# Patient Record
Sex: MALE | NOT HISPANIC OR LATINO | Employment: OTHER | URBAN - METROPOLITAN AREA
[De-identification: names, ages, dates, MRNs, and addresses within clinical notes are randomized per-mention and may not be internally consistent; named-entity substitution may affect disease eponyms.]

---

## 2021-03-24 DIAGNOSIS — N50.819 TESTICULAR PAIN: Primary | ICD-10-CM

## 2021-03-25 ENCOUNTER — OFFICE VISIT (OUTPATIENT)
Dept: UROLOGY | Facility: CLINIC | Age: 76
End: 2021-03-25
Payer: MEDICARE

## 2021-03-25 VITALS
SYSTOLIC BLOOD PRESSURE: 135 MMHG | BODY MASS INDEX: 47.74 KG/M2 | HEIGHT: 68 IN | WEIGHT: 315 LBS | HEART RATE: 79 BPM | RESPIRATION RATE: 18 BRPM | DIASTOLIC BLOOD PRESSURE: 73 MMHG

## 2021-03-25 DIAGNOSIS — N50.812 TESTICULAR PAIN, LEFT: Primary | ICD-10-CM

## 2021-03-25 PROCEDURE — 99203 OFFICE O/P NEW LOW 30 MIN: CPT | Mod: S$GLB,,, | Performed by: UROLOGY

## 2021-03-25 PROCEDURE — 99203 PR OFFICE/OUTPT VISIT, NEW, LEVL III, 30-44 MIN: ICD-10-PCS | Mod: S$GLB,,, | Performed by: UROLOGY

## 2021-03-25 RX ORDER — UMECLIDINIUM BROMIDE AND VILANTEROL TRIFENATATE 62.5; 25 UG/1; UG/1
POWDER RESPIRATORY (INHALATION)
COMMUNITY
Start: 2021-01-28

## 2021-03-25 RX ORDER — GLIMEPIRIDE 4 MG/1
TABLET ORAL
COMMUNITY
Start: 2021-03-24

## 2021-03-25 RX ORDER — ROSUVASTATIN CALCIUM 20 MG/1
TABLET, COATED ORAL
COMMUNITY
Start: 2020-12-26

## 2021-03-25 RX ORDER — LISINOPRIL AND HYDROCHLOROTHIAZIDE 10; 12.5 MG/1; MG/1
TABLET ORAL
COMMUNITY
Start: 2021-01-09

## 2021-03-25 RX ORDER — METOPROLOL SUCCINATE 100 MG/1
TABLET, EXTENDED RELEASE ORAL
COMMUNITY
Start: 2021-01-26

## 2021-03-25 RX ORDER — INSULIN DEGLUDEC 100 U/ML
INJECTION, SOLUTION SUBCUTANEOUS
COMMUNITY
Start: 2021-02-15

## 2021-04-08 ENCOUNTER — TELEPHONE (OUTPATIENT)
Dept: UROLOGY | Facility: CLINIC | Age: 76
End: 2021-04-08

## 2024-01-09 ENCOUNTER — TELEPHONE (OUTPATIENT)
Dept: HEMATOLOGY/ONCOLOGY | Facility: CLINIC | Age: 79
End: 2024-01-09
Payer: MEDICARE

## 2024-01-22 ENCOUNTER — OFFICE VISIT (OUTPATIENT)
Dept: HEMATOLOGY/ONCOLOGY | Facility: CLINIC | Age: 79
End: 2024-01-22
Payer: MEDICARE

## 2024-01-22 VITALS
HEIGHT: 69 IN | OXYGEN SATURATION: 95 % | HEART RATE: 79 BPM | WEIGHT: 308.88 LBS | SYSTOLIC BLOOD PRESSURE: 144 MMHG | RESPIRATION RATE: 18 BRPM | BODY MASS INDEX: 45.75 KG/M2 | DIASTOLIC BLOOD PRESSURE: 82 MMHG

## 2024-01-22 DIAGNOSIS — D69.6 THROMBOCYTOPENIA: ICD-10-CM

## 2024-01-22 DIAGNOSIS — E66.01 CLASS 3 OBESITY: ICD-10-CM

## 2024-01-22 DIAGNOSIS — D53.9 MACROCYTIC ANEMIA: Primary | ICD-10-CM

## 2024-01-22 LAB
BASOPHILS NFR BLD: 0.4 % (ref 0–3)
EOSINOPHIL NFR BLD: 2.3 % (ref 1–3)
ERYTHROCYTE [DISTWIDTH] IN BLOOD BY AUTOMATED COUNT: 14.6 % (ref 12.5–18)
HCT VFR BLD AUTO: 41 % (ref 42–52)
HGB BLD-MCNC: 14.1 G/DL (ref 14–18)
LYMPHOCYTES NFR BLD: 29.4 % (ref 25–40)
MCH RBC QN AUTO: 33.9 PG (ref 27–31.2)
MCHC RBC AUTO-ENTMCNC: 34.4 G/DL (ref 31.8–35.4)
MCV RBC AUTO: 98.6 FL (ref 80–97)
MONOCYTES NFR BLD: 14.1 % (ref 1–15)
NEUTROPHILS # BLD AUTO: 2.98 10*3/UL (ref 1.8–7.7)
NEUTROPHILS NFR BLD: 53.6 % (ref 37–80)
NUCLEATED RED BLOOD CELLS: 0 %
PLATELETS: 135 10*3/UL (ref 142–424)
RBC # BLD AUTO: 4.16 10*6/UL (ref 4.7–6.1)
WBC # BLD: 5.6 10*3/UL (ref 4.6–10.2)

## 2024-01-22 PROCEDURE — 99205 OFFICE O/P NEW HI 60 MIN: CPT | Mod: S$GLB,,, | Performed by: INTERNAL MEDICINE

## 2024-01-22 RX ORDER — SEMAGLUTIDE 0.68 MG/ML
INJECTION, SOLUTION SUBCUTANEOUS
COMMUNITY

## 2024-01-22 RX ORDER — NEBIVOLOL 20 MG/1
20 TABLET ORAL
COMMUNITY

## 2024-01-22 NOTE — PROGRESS NOTES
HEMATOLOGY INITIAL CONSULTATION NOTE    Patient ID: Brandin Burrell is a 78 y.o. male.    Chief Complaint: Macrocytic anemia/ thrombocytopenia    HPI:  Patient is a 78-year-old male with past medical history of atrial fibrillation, COPD, diabetes mellitus, obesity who was referred by his PCP for evaluation of microcytic anemia and thrombocytopenia noted on his labs recently.  Patient also has history of splenomegaly, pulmonary hypertension, hyperlipidemia, rheumatoid factor positivity and erythema nodosum and history of abnormal thyroid levels in the past.  He presents to our clinic today for further evaluation voices no acute complaints at this time.                Past Medical History:   Diagnosis Date    A-fib     COPD (chronic obstructive pulmonary disease)     Diabetes mellitus     Obesity        History reviewed. No pertinent family history.    Social History     Socioeconomic History    Marital status:    Tobacco Use    Smoking status: Former    Smokeless tobacco: Never    Tobacco comments:     30 years ago    Substance and Sexual Activity    Alcohol use: Yes    Drug use: Never    Sexual activity: Not Currently         Past Surgical History:   Procedure Laterality Date    KNEE SURGERY                   Review of systems:  Review of Systems   Constitutional:  Negative for activity change, appetite change, chills, diaphoresis, fatigue and unexpected weight change.   HENT:  Negative for congestion, facial swelling, hearing loss, mouth sores, trouble swallowing and voice change.    Eyes:  Negative for photophobia, pain, discharge and itching.   Respiratory:  Negative for apnea, cough, choking, chest tightness and shortness of breath.    Cardiovascular:  Negative for chest pain, palpitations and leg swelling.   Gastrointestinal:  Negative for abdominal distention, abdominal pain, anal bleeding and blood in stool.   Endocrine: Negative for cold intolerance, heat intolerance, polydipsia and polyphagia.    Genitourinary:  Negative for difficulty urinating, dysuria, flank pain and hematuria.   Musculoskeletal:  Negative for arthralgias, back pain, joint swelling, myalgias, neck pain and neck stiffness.   Skin:  Negative for color change, pallor and wound.   Allergic/Immunologic: Negative for environmental allergies, food allergies and immunocompromised state.   Neurological:  Negative for dizziness, seizures, facial asymmetry, speech difficulty, light-headedness, numbness and headaches.   Hematological:  Negative for adenopathy. Does not bruise/bleed easily.   Psychiatric/Behavioral:  Negative for agitation, behavioral problems, confusion, decreased concentration and sleep disturbance.                                                Physical Exam  Vitals and nursing note reviewed.   Constitutional:       General: He is not in acute distress.     Appearance: Normal appearance. He is not ill-appearing.   HENT:      Head: Normocephalic and atraumatic.      Nose: No congestion or rhinorrhea.   Eyes:      General: No scleral icterus.     Extraocular Movements: Extraocular movements intact.      Pupils: Pupils are equal, round, and reactive to light.   Cardiovascular:      Rate and Rhythm: Normal rate and regular rhythm.      Pulses: Normal pulses.      Heart sounds: Normal heart sounds. No murmur heard.     No gallop.   Pulmonary:      Effort: Pulmonary effort is normal. No respiratory distress.      Breath sounds: Normal breath sounds. No stridor. No wheezing or rhonchi.   Abdominal:      General: Bowel sounds are normal. There is no distension.      Palpations: There is no mass.      Tenderness: There is no abdominal tenderness. There is no guarding.   Musculoskeletal:         General: No swelling, tenderness, deformity or signs of injury. Normal range of motion.      Cervical back: Normal range of motion and neck supple. No rigidity. No muscular tenderness.      Right lower leg: No edema.      Left lower leg: No edema.    Skin:     General: Skin is warm.      Coloration: Skin is not jaundiced or pale.      Findings: No bruising or lesion.   Neurological:      General: No focal deficit present.      Mental Status: He is alert and oriented to person, place, and time.      Cranial Nerves: No cranial nerve deficit.      Sensory: No sensory deficit.      Motor: No weakness.      Gait: Gait normal.   Psychiatric:         Mood and Affect: Mood normal.         Behavior: Behavior normal.         Thought Content: Thought content normal.       Vitals:    01/22/24 1010   BP: (!) 144/82   Pulse: 79   Resp: 18   Body surface area is 2.61 meters squared.    Assessment/Plan:      Macrocytic anemia and thrombocytopenia:    == I do believe we are looking at mild thrombocytopenia which seems reactive based on prior medical history review and also questionable history of splenomegaly.  Nonetheless platelet counts are pretty stable and do not feel any acute intervention is needed on that front.  Reviewed CBC with differential and patient is noted to have slight macrocytosis along with normal white blood cell count.  He did have abnormal TSH levels in the past and I will repeat TSH at this time.  Nonetheless I do not see any abnormalities in neutrophil count, eosinophil count and basophil count at this time.  Do not feel a bone marrow biopsy is indicated for these likely reactive changes on his blood count and blood picture.  Will obtain TSH and flow cytometry for completion.  If any abnormalities noted on flow will consider a bone marrow biopsy but if no abnormalities are noted on these above labs will continue with observation        Plan:  Peripheral smear, flow cytometry, TSH      Return to clinic in 2 months for MD visit.  No labs prior    Future Appointments   Date Time Provider Department Center   3/1/2024  9:40 AM [facility]   3/4/2024  9:40 AM [facility]           Pt is  instructed to RTC with labs for continued monitoring of treatment as instructed.     Total time spent in counseling and discussion about further management options including relevant lab work, treatment,  prognosis, medications and intended side effects was more than 60 minutes. More than 50 % of the time was spent in counseling and coordination of care.  This includes face to face time and non-face to face time preparing to see the patient (eg, review of tests), Obtaining and/or reviewing separately obtained history, Documenting clinical information in the electronic or other health record, Independently interpreting resultsand communicating results to the patient/family/caregiver, or Care coordination.

## 2024-01-24 PROBLEM — D53.9 MACROCYTIC ANEMIA: Status: ACTIVE | Noted: 2024-01-24

## 2024-01-24 PROBLEM — D69.6 THROMBOCYTOPENIA: Status: ACTIVE | Noted: 2024-01-24

## 2024-01-24 PROBLEM — E66.01 CLASS 3 OBESITY: Status: ACTIVE | Noted: 2024-01-24

## 2024-03-01 DIAGNOSIS — D53.9 MACROCYTIC ANEMIA: Primary | ICD-10-CM

## 2024-03-01 LAB
BASOPHILS NFR BLD: 0.4 % (ref 0–3)
EOSINOPHIL NFR BLD: 1.5 % (ref 1–3)
ERYTHROCYTE [DISTWIDTH] IN BLOOD BY AUTOMATED COUNT: 13 % (ref 12.5–18)
HCT VFR BLD AUTO: 36.6 % (ref 42–52)
HGB BLD-MCNC: 12.8 G/DL (ref 14–18)
LYMPHOCYTES NFR BLD: 23.3 % (ref 25–40)
MCH RBC QN AUTO: 32.8 PG (ref 27–31.2)
MCHC RBC AUTO-ENTMCNC: 35 G/DL (ref 31.8–35.4)
MCV RBC AUTO: 93.8 FL (ref 80–97)
MONOCYTES NFR BLD: 10.8 % (ref 1–15)
NEUTROPHILS # BLD AUTO: 3.07 10*3/UL (ref 1.8–7.7)
NEUTROPHILS NFR BLD: 64 % (ref 37–80)
NUCLEATED RED BLOOD CELLS: 0 %
PLATELETS: 110 10*3/UL (ref 142–424)
RBC # BLD AUTO: 3.9 10*6/UL (ref 4.7–6.1)
WBC # BLD: 4.8 10*3/UL (ref 4.6–10.2)

## 2024-03-04 ENCOUNTER — OFFICE VISIT (OUTPATIENT)
Dept: HEMATOLOGY/ONCOLOGY | Facility: CLINIC | Age: 79
End: 2024-03-04
Payer: MEDICARE

## 2024-03-04 VITALS
HEIGHT: 69 IN | RESPIRATION RATE: 18 BRPM | WEIGHT: 302.31 LBS | BODY MASS INDEX: 44.78 KG/M2 | HEART RATE: 80 BPM | SYSTOLIC BLOOD PRESSURE: 130 MMHG | OXYGEN SATURATION: 95 % | DIASTOLIC BLOOD PRESSURE: 75 MMHG

## 2024-03-04 DIAGNOSIS — D53.9 MACROCYTIC ANEMIA: Primary | ICD-10-CM

## 2024-03-04 LAB
FOLATE: 20.7 NG/ML (ref 3.1–17.5)
TSH SERPL DL<=0.005 MIU/L-ACNC: 0.32 UIU/ML (ref 0.36–3.74)
VITAMIN B12: 708 PG/ML (ref 193–986)

## 2024-03-04 PROCEDURE — G2211 COMPLEX E/M VISIT ADD ON: HCPCS | Mod: S$GLB,,, | Performed by: INTERNAL MEDICINE

## 2024-03-04 PROCEDURE — 99497 ADVNCD CARE PLAN 30 MIN: CPT | Mod: S$GLB,,, | Performed by: INTERNAL MEDICINE

## 2024-03-04 PROCEDURE — 99214 OFFICE O/P EST MOD 30 MIN: CPT | Mod: S$GLB,,, | Performed by: INTERNAL MEDICINE

## 2024-03-04 NOTE — PROGRESS NOTES
HEMATOLOGY FOLLOW UP CONSULTATION NOTE    Patient ID: Brandin Burrell is a 78 y.o. male.    Chief Complaint: Macrocytic anemia/ thrombocytopenia    HPI:  Patient is a 78-year-old male with past medical history of atrial fibrillation, COPD, diabetes mellitus, obesity who was referred by his PCP for evaluation of microcytic anemia and thrombocytopenia noted on his labs recently.  Patient also has history of splenomegaly, pulmonary hypertension, hyperlipidemia, rheumatoid factor positivity and erythema nodosum and history of abnormal thyroid levels in the past.  He presents to our clinic today for further evaluation voices no acute complaints at this time.                Past Medical History:   Diagnosis Date    A-fib     COPD (chronic obstructive pulmonary disease)     Diabetes mellitus     Obesity        No family history on file.    Social History     Socioeconomic History    Marital status:    Tobacco Use    Smoking status: Former    Smokeless tobacco: Never    Tobacco comments:     30 years ago    Substance and Sexual Activity    Alcohol use: Yes    Drug use: Never    Sexual activity: Not Currently         Past Surgical History:   Procedure Laterality Date    KNEE SURGERY                   Review of systems:  Review of Systems   Constitutional:  Negative for activity change, appetite change, chills, diaphoresis, fatigue and unexpected weight change.   HENT:  Negative for congestion, facial swelling, hearing loss, mouth sores, trouble swallowing and voice change.    Eyes:  Negative for photophobia, pain, discharge and itching.   Respiratory:  Negative for apnea, cough, choking, chest tightness and shortness of breath.    Cardiovascular:  Negative for chest pain, palpitations and leg swelling.   Gastrointestinal:  Negative for abdominal distention, abdominal pain, anal bleeding and blood in stool.   Endocrine: Negative for cold intolerance, heat intolerance, polydipsia and polyphagia.   Genitourinary:   Negative for difficulty urinating, dysuria, flank pain and hematuria.   Musculoskeletal:  Negative for arthralgias, back pain, joint swelling, myalgias, neck pain and neck stiffness.   Skin:  Negative for color change, pallor and wound.   Allergic/Immunologic: Negative for environmental allergies, food allergies and immunocompromised state.   Neurological:  Negative for dizziness, seizures, facial asymmetry, speech difficulty, light-headedness, numbness and headaches.   Hematological:  Negative for adenopathy. Does not bruise/bleed easily.   Psychiatric/Behavioral:  Negative for agitation, behavioral problems, confusion, decreased concentration and sleep disturbance.                                                Physical Exam  Vitals and nursing note reviewed.   Constitutional:       General: He is not in acute distress.     Appearance: Normal appearance. He is not ill-appearing.   HENT:      Head: Normocephalic and atraumatic.      Nose: No congestion or rhinorrhea.   Eyes:      General: No scleral icterus.     Extraocular Movements: Extraocular movements intact.      Pupils: Pupils are equal, round, and reactive to light.   Cardiovascular:      Rate and Rhythm: Normal rate and regular rhythm.      Pulses: Normal pulses.      Heart sounds: Normal heart sounds. No murmur heard.     No gallop.   Pulmonary:      Effort: Pulmonary effort is normal. No respiratory distress.      Breath sounds: Normal breath sounds. No stridor. No wheezing or rhonchi.   Abdominal:      General: Bowel sounds are normal. There is no distension.      Palpations: There is no mass.      Tenderness: There is no abdominal tenderness. There is no guarding.   Musculoskeletal:         General: No swelling, tenderness, deformity or signs of injury. Normal range of motion.      Cervical back: Normal range of motion and neck supple. No rigidity. No muscular tenderness.      Right lower leg: No edema.      Left lower leg: No edema.   Skin:      General: Skin is warm.      Coloration: Skin is not jaundiced or pale.      Findings: No bruising or lesion.   Neurological:      General: No focal deficit present.      Mental Status: He is alert and oriented to person, place, and time.      Cranial Nerves: No cranial nerve deficit.      Sensory: No sensory deficit.      Motor: No weakness.      Gait: Gait normal.   Psychiatric:         Mood and Affect: Mood normal.         Behavior: Behavior normal.         Thought Content: Thought content normal.       Vitals:    03/04/24 0951   BP: 130/75   Pulse: 80   Resp: 18     Body surface area is 2.58 meters squared.    Assessment/Plan:      Macrocytic anemia and thrombocytopenia:    == I do believe we are looking at mild thrombocytopenia which seems reactive based on prior medical history review and also questionable history of splenomegaly.  Nonetheless platelet counts are pretty stable and do not feel any acute intervention is needed on that front.  Reviewed CBC with differential and patient is noted to have slight macrocytosis along with normal white blood cell count.  He did have abnormal TSH levels in the past and I will repeat TSH at this time.  Nonetheless I do not see any abnormalities in neutrophil count, eosinophil count and basophil count at this time.  Do not feel a bone marrow biopsy is indicated for these likely reactive changes on his blood count and blood picture.  Will obtain TSH and flow cytometry for completion.  If any abnormalities noted on flow will consider a bone marrow biopsy but if no abnormalities are noted on these above labs will continue with observation.  == 3/4/24: I had ordered TSH levels previously but those were not done. Drop in Hgb to 12.8 with decline in platelet count since last month. Peripheral blood smear review revealed macrocytic, normochromic red cell population with mildly decreased RBC count. Normal WBC count and differential. Mild thrombocytopenia. No diagnostic  immunophenotypic abnormalities detected via Flow cytometry. Discussed with patient that early MDS is still a possibility considering his age and we can consider a bone marrow biopsy in future.         Advance Care Planning     Date: 03/04/2024    Power of   I initiated the process of voluntary advance care planning today and explained the importance of this process to the patient.  I introduced the concept of advance directives to the patient, as well. Then the patient received detailed information about the importance of designating a Health Care Power of  (HCPOA). He was also instructed to communicate with this person about their wishes for future healthcare, should he become sick and lose decision-making capacity. The patient has not previously appointed a HCPOA. After our discussion, the patient has decided to complete a HCPOA and has appointed his daughter, signed form with her details are scanned on the chart.   I spent a total time of 20 minutes discussing this issue with the patient.             Plan:  TSH, B12 and folate levels  Continue observation    Return to clinic in 3 months for MD visit with CBC with diff prior      Pt is instructed to RTC with labs for continued monitoring of treatment as instructed.     Total time spent in counseling and discussion about further management options including relevant lab work, treatment,  prognosis, medications and intended side effects was more than 40 minutes. More than 50 % of the time was spent in counseling and coordination of care.  This includes face to face time and non-face to face time preparing to see the patient (eg, review of tests), Obtaining and/or reviewing separately obtained history, Documenting clinical information in the electronic or other health record, Independently interpreting resultsand communicating results to the patient/family/caregiver, or Care coordination.

## 2024-05-31 DIAGNOSIS — D53.9 MACROCYTIC ANEMIA: Primary | ICD-10-CM
